# Patient Record
Sex: MALE | Race: WHITE | NOT HISPANIC OR LATINO | Employment: FULL TIME | RURAL
[De-identification: names, ages, dates, MRNs, and addresses within clinical notes are randomized per-mention and may not be internally consistent; named-entity substitution may affect disease eponyms.]

---

## 2022-10-06 ENCOUNTER — TELEMEDICINE (OUTPATIENT)
Dept: FAMILY MEDICINE CLINIC | Facility: CLINIC | Age: 37
End: 2022-10-06

## 2022-10-06 DIAGNOSIS — L03.113 CELLULITIS OF RIGHT HAND: Primary | ICD-10-CM

## 2022-10-06 DIAGNOSIS — S61.451A DOG BITE, HAND, RIGHT, INITIAL ENCOUNTER: ICD-10-CM
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
## 2022-10-06 PROCEDURE — 99213 OFFICE O/P EST LOW 20 MIN: CPT | Performed by: INTERNAL MEDICINE

## 2022-10-06 RX ORDER — AMOXICILLIN AND CLAVULANATE POTASSIUM 875; 125 MG/1; MG/1
1 TABLET, FILM COATED ORAL 2 TIMES DAILY
Qty: 20 TABLET | Refills: 0 | Status: SHIPPED | OUTPATIENT
Start: 2022-10-06 | End: 2023-02-18 | Stop reason: SDUPTHER

## 2022-10-06 NOTE — ASSESSMENT & PLAN NOTE
Dog bite from the family dog 2 days ago with pattern of mild cellulitis, which is doing a bit better today which is reassuring.  Nonetheless we need to initiate Augmentin and mupirocin as per prescriptions.  I reinforced that these types of injuries can become secondarily progressed quite rapidly, and as such he needs to have low threshold to be assessed at an ER if he had new onset pain, swelling, or spreading redness up the arm, in addition to any constitutional Navarrete such as fevers or chills.  He understands.    Note he had his Tdap last 11/9/2015, such he is technically up-to-date, if you are in the clinic I would give him a boost, but he is unable to come in today.  Recommend booster at soonest convenience.

## 2022-10-06 NOTE — PROGRESS NOTES
Telehealth E-Visit      Date: 10/06/2022   Patient Name: Emanuel Capellan  : 1985   MRN: [id number]     Chief Complaint:    Chief Complaint   Patient presents with   • Animal Bite     Bitten by family dog with puncture wound       I have reviewed the E-Visit questionnaire and the patient's answers, my assessment and plan are listed below.     This provider is located at the [facility] (through [facility]), [address] [place] [address] using a secure [employer] Visit through [employer]. Patient is being seen remotely via telehealth at their home address in [place], and stated they are in a secure environment for this session. The patient's condition being diagnosed/treated is appropriate for telemedicine. The provider identified herself as well as her credentials. The patient, and/or patients guardian, consent to be seen remotely, and when consent is given they understand that the consent allows for patient identifiable information to be sent to a third party as needed. They may refuse to be seen remotely at any time. The electronic data is encrypted and password protected, and the patient and/or guardian has been advised of the potential risks to privacy not withstanding such measures.    You have chosen to receive care through a telehealth visit. Do you consent to use a video/audio connection for your medical care today? Yes    History of Present Illness: Emanuel Capellan is a 36 y.o. male who is here today to follow up with concern of dog bite with some redness noted yesterday.  2 days ago, bitten by his family dog, who had an injury.  A single puncture wound on the right hand, overlying the location approximate the middle finger MCP area.  Other than some pain as expected with the bite, it was doing fine, then yesterday had some redness, puffiness and more pain.  He has been soaking it, using Neosporin, and it does seem to be doing a bit better today.  No fevers or  chills, no spreading redness if anything it is doing a little bit better today from around the site of the puncture wound      Subjective      Review of Systems:   Review of Systems    I have reviewed and the following portions of the patient's history were updated as appropriate: past family history, past medical history, past social history, past surgical history and problem list.    Medications:     Current Outpatient Medications:   •  amoxicillin-clavulanate (Augmentin) 875-125 MG per tablet, Take 1 tablet by mouth 2 (Two) Times a Day., Disp: 20 tablet, Rfl: 0  •  mupirocin (BACTROBAN) 2 % ointment, Apply 1 application topically to the appropriate area as directed 3 (Three) Times a Day., Disp: 22 g, Rfl: 0    Allergies:   No Known Allergies    Objective     Physical Exam:  Vital Signs: There were no vitals filed for this visit.  There is no height or weight on file to calculate BMI.    Physical Exam     Limitations per telemedicine visit but he has a small puncture wound at the area of the right MCP, with mild pink coloration and puffiness around the site, but no signs of streaking.  Patient is able to move his hand, flex extend without difficulty but slight sense of tightness.      Assessment / Plan      Assessment/Plan:   Diagnoses and all orders for this visit:    1. Cellulitis of right hand (Primary)  Assessment & Plan:  Dog bite from the family dog 2 days ago with pattern of mild cellulitis, which is doing a bit better today which is reassuring.  Nonetheless we need to initiate Augmentin and mupirocin as per prescriptions.  I reinforced that these types of injuries can become secondarily progressed quite rapidly, and as such he needs to have low threshold to be assessed at an ER if he had new onset pain, swelling, or spreading redness up the arm, in addition to any constitutional Navarrete such as fevers or chills.  He understands.    Note he had his Tdap last 11/9/2015, such he is technically up-to-date, if  you are in the clinic I would give him a boost, but he is unable to come in today.  Recommend booster at soonest convenience.    Orders:  -     amoxicillin-clavulanate (Augmentin) 875-125 MG per tablet; Take 1 tablet by mouth 2 (Two) Times a Day.  Dispense: 20 tablet; Refill: 0  -     mupirocin (BACTROBAN) 2 % ointment; Apply 1 application topically to the appropriate area as directed 3 (Three) Times a Day.  Dispense: 22 g; Refill: 0    2. Dog bite, hand, right, initial encounter    3. Home accident       Follow Up:   No follow-ups on file.    Any medications prescribed have been sent electronically to   [id number] - [place] - [address] AT [facility] [address] - [phone]  - [phone] FX  [address] [place] [address]  Phone: [phone] Fax: [phone]      Charlie Braxton. MD [place]  [place]  10/06/22  11:26 EDT

## 2023-02-18 ENCOUNTER — DOCUMENTATION (OUTPATIENT)
Dept: FAMILY MEDICINE CLINIC | Facility: CLINIC | Age: 38
End: 2023-02-18
Payer: COMMERCIAL

## 2023-02-18 DIAGNOSIS — L03.113 CELLULITIS OF RIGHT HAND: Primary | ICD-10-CM

## 2023-02-18 RX ORDER — AMOXICILLIN AND CLAVULANATE POTASSIUM 875; 125 MG/1; MG/1
1 TABLET, FILM COATED ORAL 2 TIMES DAILY
Qty: 20 TABLET | Refills: 0 | Status: SHIPPED | OUTPATIENT
Start: 2023-02-18

## 2023-02-18 RX ORDER — POLYMYXIN B SULFATE AND TRIMETHOPRIM 1; 10000 MG/ML; [USP'U]/ML
1 SOLUTION OPHTHALMIC EVERY 4 HOURS
Qty: 10 ML | Refills: 0 | Status: SHIPPED | OUTPATIENT
Start: 2023-02-18 | End: 2023-02-25

## 2024-05-28 PROBLEM — J30.1 SEASONAL ALLERGIC RHINITIS DUE TO POLLEN: Status: ACTIVE | Noted: 2024-05-28

## 2024-05-28 PROBLEM — Z00.01 ENCOUNTER FOR GENERAL ADULT MEDICAL EXAMINATION WITH ABNORMAL FINDINGS: Status: ACTIVE | Noted: 2024-05-28

## 2024-05-28 PROBLEM — E78.2 MIXED HYPERLIPIDEMIA: Status: ACTIVE | Noted: 2024-05-28

## 2024-05-29 ENCOUNTER — OFFICE VISIT (OUTPATIENT)
Dept: FAMILY MEDICINE CLINIC | Facility: CLINIC | Age: 39
End: 2024-05-29
Payer: COMMERCIAL

## 2024-05-29 VITALS
SYSTOLIC BLOOD PRESSURE: 120 MMHG | HEART RATE: 70 BPM | BODY MASS INDEX: 25.92 KG/M2 | DIASTOLIC BLOOD PRESSURE: 80 MMHG | OXYGEN SATURATION: 97 % | HEIGHT: 72 IN | TEMPERATURE: 97.7 F | WEIGHT: 191.38 LBS

## 2024-05-29 DIAGNOSIS — Z00.01 ENCOUNTER FOR GENERAL ADULT MEDICAL EXAMINATION WITH ABNORMAL FINDINGS: Primary | ICD-10-CM

## 2024-05-29 DIAGNOSIS — N45.1 EPIDIDYMITIS, LEFT: ICD-10-CM

## 2024-05-29 DIAGNOSIS — E66.3 OVERWEIGHT: ICD-10-CM

## 2024-05-29 DIAGNOSIS — E78.2 MIXED HYPERLIPIDEMIA: ICD-10-CM

## 2024-05-29 DIAGNOSIS — J30.1 SEASONAL ALLERGIC RHINITIS DUE TO POLLEN: ICD-10-CM

## 2024-05-29 DIAGNOSIS — L23.7 POISON IVY: ICD-10-CM

## 2024-05-29 PROBLEM — R30.0 DYSURIA: Status: ACTIVE | Noted: 2024-05-29

## 2024-05-29 PROBLEM — Z72.0 SMOKELESS TOBACCO USE: Status: ACTIVE | Noted: 2024-05-29

## 2024-05-29 PROBLEM — Z87.891 HISTORY OF SMOKELESS TOBACCO USE: Status: ACTIVE | Noted: 2024-05-29

## 2024-05-29 LAB
BILIRUB BLD-MCNC: NEGATIVE MG/DL
CLARITY, POC: CLEAR
COLOR UR: YELLOW
GLUCOSE UR STRIP-MCNC: NEGATIVE MG/DL
KETONES UR QL: NEGATIVE
LEUKOCYTE EST, POC: NEGATIVE
NITRITE UR-MCNC: NEGATIVE MG/ML
PH UR: 6 [PH] (ref 5–8)
PROT UR STRIP-MCNC: NEGATIVE MG/DL
RBC # UR STRIP: NEGATIVE /UL
SP GR UR: 1.02 (ref 1–1.03)
UROBILINOGEN UR QL: ABNORMAL

## 2024-05-29 PROCEDURE — 99395 PREV VISIT EST AGE 18-39: CPT | Performed by: INTERNAL MEDICINE

## 2024-05-29 PROCEDURE — 81002 URINALYSIS NONAUTO W/O SCOPE: CPT | Performed by: INTERNAL MEDICINE

## 2024-05-29 PROCEDURE — 99214 OFFICE O/P EST MOD 30 MIN: CPT | Performed by: INTERNAL MEDICINE

## 2024-05-29 PROCEDURE — 36415 COLL VENOUS BLD VENIPUNCTURE: CPT | Performed by: INTERNAL MEDICINE

## 2024-05-29 RX ORDER — TRIAMCINOLONE ACETONIDE 1 MG/G
1 CREAM TOPICAL 2 TIMES DAILY
Qty: 28.4 G | Refills: 2 | Status: SHIPPED | OUTPATIENT
Start: 2024-05-29

## 2024-05-29 RX ORDER — LEVOFLOXACIN 500 MG/1
500 TABLET, FILM COATED ORAL DAILY
Qty: 7 TABLET | Refills: 0 | Status: SHIPPED | OUTPATIENT
Start: 2024-05-29

## 2024-05-29 NOTE — ASSESSMENT & PLAN NOTE
Modest pattern historically, seasonal more spring and fall, not typically bothersome enough that he needs medicine but sometimes will use over-the-counter antihistamine.  Continue unchanged we could add Flonase and/or Singulair in future for any breakthrough symptoms.  Addition benefit of saline spray, nasal flushing.  Advise concerns.

## 2024-05-29 NOTE — ASSESSMENT & PLAN NOTE
History of periodic dipping of tobacco products up until the age of 33 but she has abstained.  Reinforced long-term benefits of avoidance of smoking products, smokeless products, but patient understand the risks of resumption.

## 2024-05-29 NOTE — PROGRESS NOTES
Venipuncture Blood Specimen Collection  Venipuncture performed in right arm by Michelle Campbell MA with good hemostasis. Patient tolerated the procedure well without complications.   05/29/24   Michelle Campbell MA

## 2024-05-29 NOTE — PROGRESS NOTES
Male Physical Note      Date: 2024   Patient Name: Emanuel Capellan  : 1985   MRN: [id number]     Chief Complaint   Patient presents with    Annual Exam       History of Present Illness: Emanuel Capellan is a 38 y.o. male who is here today for their annual health maintenance and physical.  In addition discussion multimedical problems.  Potential genitourinary complaints over the last few weeks where he had onset of a left lower groin discomfort that would come and go with a bit of a mild ache it was not severe, was not associated to any dysuria, no association of bowel pattern, no postprandial component.  Waxed and waned every few days, just for a couple hours, at which point 4 days ago on Saturday it flared up more notably, with more of the discomfort at that location also some sense of discomfort in the left testicle especially posteriorly palpated.  No dysuria, no sexual dysfunction, no effect on bowel pattern.  No swelling or bulge in the groin region.  Since that time and over the last few days, this pattern has faded notably the next day and to the point which she has not noticed any pain today, other than when I did examine him he has a hint of discomfort on the epididymis.  Still no dysuria, no fevers or chills, feels at baseline.    Unrelated, regarding hyperlipidemia pattern, last checked 5 years ago, overall stable pattern of diet and activity, weight pattern possibly little bit up, he understands he needs to improve that regard.  He has quit dipping as of 5 years ago, understanding risks of ongoing use.  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    Subjective      Review of Systems    Past Medical History, Social History, Family History and Care Team were all reviewed with patient and updated as appropriate.       Current Outpatient Medications:      levoFLOXacin (Levaquin) 500 MG tablet, Take 1 tablet by mouth Daily., Disp: 7 tablet, Rfl: 0    triamcinolone (KENALOG) 0.1 % cream, Apply 1 Application topically to the appropriate area as directed 2 (Two) Times a Day., Disp: 28.4 g, Rfl: 2    No Known Allergies    Immunization History   Administered Date(s) Administered    COVID-19 (PFIZER) Purple Cap Monovalent 08/11/2021, 09/07/2021    Flu Vaccine Intradermal Quad 18-64YR 10/05/2017, 11/07/2019    Hepatitis B Adolescent High Risk Infant 07/31/1998, 08/28/1998, 02/24/1999    Hepatitis B Adult/Adolescent IM 07/31/1998, 08/28/1998, 02/24/1999    Influenza Seasonal Injectable 10/05/2017, 11/07/2019    OPV 07/31/1998    Tdap 11/09/2015        Health Maintenance Summary            Ordered - LIPID PANEL (Yearly) Ordered on 5/29/2024      No completion, postpone, or frequency change history exists for this topic.              Overdue - BMI FOLLOWUP (Yearly) Never done      No completion, postpone, or frequency change history exists for this topic.              Ordered - HEPATITIS C SCREENING (Once) Ordered on 5/29/2024      No completion, postpone, or frequency change history exists for this topic.              Overdue - ANNUAL PHYSICAL (Yearly) Never done      No completion, postpone, or frequency change history exists for this topic.              Overdue - COVID-19 Vaccine (3 - 2023-24 season) Overdue since 9/1/2023 09/07/2021  Imm Admin: COVID-19 (PFIZER) Purple Cap Monovalent    08/11/2021  Imm Admin: COVID-19 (PFIZER) Purple Cap Monovalent              INFLUENZA VACCINE (Yearly - August to March) Next due on 8/1/2024 12/21/2023  Postponed until 3/31/2024 by Mary Sams MA (Patient Refused - declined)    03/23/2023  Postponed until 3/31/2023 by Gayathri Bernal (Patient Refused)    11/07/2019  Imm Admin: Flu Vaccine Intradermal Quad 18-64YR    10/05/2017  Imm Admin: Flu Vaccine Intradermal Quad 18-64YR              TDAP/TD VACCINES (2 - Td or Tdap)  "Next due on 11/9/2025 11/09/2015  Imm Admin: Tdap              Pneumococcal Vaccine 0-64 (Series Information) Aged Out      No completion, postpone, or frequency change history exists for this topic.                    Colorectal Screening:   Not applicable  Last Completed Colonoscopy       This patient has no relevant Health Maintenance data.          CT for Smoker (Age 50-80, 20pk yr within last 15 years): Not applicable  Hep C (Age 18-79 once): Pending with blood work 5/29/2024  HIV (Age 15-65 once) : Pending with blood work 5/29/2024  PSA (Over age 50, C Level Recommendation): No results found for: \"PSA\"  US Aorta (For male smokers, age 65): Not applicable  A1c: No results found for: \"HGBA1C\"  Lipid panel: No results found for: \"LIPIDEXCLUSI\"    The ASCVD Risk score (Eden MEDELLIN, et al., 2019) failed to calculate for the following reasons:    The 2019 ASCVD risk score is only valid for ages 40 to 79      Tobacco Use: High Risk (5/29/2024)    Patient History     Smoking Tobacco Use: Former     Smokeless Tobacco Use: Current     Passive Exposure: Never       Social History     Substance and Sexual Activity   Alcohol Use Yes    Comment: no more than once monthly, beer intake        Social History     Substance and Sexual Activity   Drug Use Not Currently    Comment: History of remote illicit opiate use, none since 2009 with no recurrence.  No other history of any drug use reported        Diet/Physical activity: Overall fairly good activity level but not regular exercise, diet is somewhat balanced but preference for higher calorie foods which he plans to improve    Sexual health:  Social History     Substance and Sexual Activity   Sexual Activity Not on file       PHQ-2 Depression Screening  PHQ-9 Total Score: 0       Intimate partner violence: (Screen on initial visit, older adult with injury or evidence of neglect):  Violence can be a problem in many people's lives, so I now ask every patient about trauma or " "abuse they may have experienced in a relationship.  Stress/Safety - Do you feel safe in your relationship?  Afraid/Abused - Have you ever been in a relationship where you were threatened, hurt, or afraid?  Friend/Family - Are your friends aware you have been hurt?  Emergency Plan - Do you have a safe place to go and the resources you need in an emergency?    Osteoporosis:   Men: history of low trauma fracture, androgen deprivation therapy for prostate cancer, hypogonadism, primary hyperparathyroidism, intestinal disorders.     Objective     Physical Exam:  Vitals:    05/29/24 0847   BP: 120/80   BP Location: Right arm   Patient Position: Sitting   Cuff Size: Adult   Pulse: 70   Temp: 97.7 °F (36.5 °C)   TempSrc: Temporal   SpO2: 97%   Weight: 86.8 kg (191 lb 6 oz)   Height: 182.9 cm (72\")     Body mass index is 25.96 kg/m².     Physical Exam    Procedures    Assessment / Plan      Assessment/Plan:   Diagnoses and all orders for this visit:    1. Encounter for general adult medical examination with abnormal findings (Primary)  Assessment & Plan:  No cardiac or pulmonary problems known. History of opiate abuse, cessation in 2010, with no recurrence since. Episode of hematuria with dysuria, non-concerning evaluation as of 2/12/2016  by Dr. Yo Erickson including non-concerning flexible cystourethroscopy with associated bilateral vasectomy 4/12/2016.   Last screening blood work 2/5/2019, repeat ordered 5/29/2024 with management per results.  Sixth-grade vaccinations given. TDaP vaccination given 11/9/2015.  Recommend yearly flu vaccine and COVID vaccination.    Orders:  -     CBC & Differential; Future  -     Comprehensive Metabolic Panel; Future  -     Lipid Panel; Future  -     TSH Rfx On Abnormal To Free T4; Future  -     Hemoglobin A1c; Future  -     Hepatitis C Antibody; Future  -     HIV-1 / O / 2 Ag / Antibody; Future  -     HIV-1 / O / 2 Ag / Antibody  -     Hepatitis C Antibody  -     Hemoglobin A1c  -     " TSH Rfx On Abnormal To Free T4  -     Lipid Panel  -     Comprehensive Metabolic Panel  -     CBC & Differential    2. Epididymitis, left  Assessment & Plan:  Onset the last couple weeks of a mild discomfort in the left lower groin as well as the epididymal region which over the last few days has essentially resolved.  Only a hint to residual discomfort at the epididymis and in the left groin at the insertion of the spermatic cord.  Overall consistent with epididymitis, no concern for torsion or hernia with negative evaluation in that regard.  Although most likely viral, for completeness we will cover with addition of Levaquin 500 mg daily x 7 days.  Urinalysis notably negative.  At this point time that should continue to resolve if there is recurrence or change in the nature of pain he would need to advise.    Orders:  -     POC Urinalysis Dipstick  -     levoFLOXacin (Levaquin) 500 MG tablet; Take 1 tablet by mouth Daily.  Dispense: 7 tablet; Refill: 0    3. Poison ivy  Assessment & Plan:  Modest pattern of poison ivy especially in the right anterior wrist and upper arm, intermittently flares especially through the summer months as he frequently mows for a part-time job.  Add triamcinolone 0.1% cream 2-3 times daily to affected site, avoid use in the face and genitourinary region.  Additional benefit of antihistamine such as Claritin or Zyrtec for the next 5 to 7 days, then as needed.  Cool compresses, calamine lotion, etc. as benefit symptoms.  Advised if not improving.    Orders:  -     triamcinolone (KENALOG) 0.1 % cream; Apply 1 Application topically to the appropriate area as directed 2 (Two) Times a Day.  Dispense: 28.4 g; Refill: 2    4. Mixed hyperlipidemia  Assessment & Plan:  Modest pattern noted previously.  Most recent cholesterol profile with 2/5/2019 total cholesterol 202, triglycerides 112, HDL 37, . Comparison 2/17/2015 with total cholesterol 210, triglycerides 60, HDL 42, .  Strong  family history including in his mother of notable dyslipidemia.  Continue recommendation healthy diet, exercise and maintaining healthy weight pattern.  Recheck cholesterol profile at this time.      5. Overweight  Assessment & Plan:  Slightly overweight by BMI at 25.9, but in context of dyslipidemia pattern historically, benefits of healthy diet, exercise and even modest weight loss.      6. Seasonal allergic rhinitis due to pollen  Assessment & Plan:  Modest pattern historically, seasonal more spring and fall, not typically bothersome enough that he needs medicine but sometimes will use over-the-counter antihistamine.  Continue unchanged we could add Flonase and/or Singulair in future for any breakthrough symptoms.  Addition benefit of saline spray, nasal flushing.  Advise concerns.           Vaccine Counseling:      Healthcare Maintenance:  Counseling provided based on age appropriate USPSTF guidelines.  BMI is >= 25 and <30. (Overweight) The following options were offered after discussion;: weight loss educational material (shared in after visit summary), exercise counseling/recommendations, and nutrition counseling/recommendations    Emanuel Capellan voices understanding and acceptance of this advice and will call back with any further questions or concerns. AVS with preventive healthcare tips printed for patient.     Follow Up:   Return in about 1 year (around 5/29/2025) for Annual physical.        Charlie Braxton MD  [facility] [place]

## 2024-05-29 NOTE — ASSESSMENT & PLAN NOTE
No cardiac or pulmonary problems known. History of opiate abuse, cessation in 2010, with no recurrence since. Episode of hematuria with dysuria, non-concerning evaluation as of 2/12/2016  by Dr. Yo Erickson including non-concerning flexible cystourethroscopy with associated bilateral vasectomy 4/12/2016.   Last screening blood work 2/5/2019, repeat ordered 5/29/2024 with management per results.  Sixth-grade vaccinations given. TDaP vaccination given 11/9/2015.  Recommend yearly flu vaccine and COVID vaccination.

## 2024-05-29 NOTE — ASSESSMENT & PLAN NOTE
Slightly overweight by BMI at 25.9, but in context of dyslipidemia pattern historically, benefits of healthy diet, exercise and even modest weight loss.

## 2024-05-29 NOTE — ASSESSMENT & PLAN NOTE
Modest pattern noted previously.  Most recent cholesterol profile with 2/5/2019 total cholesterol 202, triglycerides 112, HDL 37, . Comparison 2/17/2015 with total cholesterol 210, triglycerides 60, HDL 42, .  Strong family history including in his mother of notable dyslipidemia.  Continue recommendation healthy diet, exercise and maintaining healthy weight pattern.  Recheck cholesterol profile at this time.

## 2024-05-29 NOTE — ASSESSMENT & PLAN NOTE
Modest pattern of poison ivy especially in the right anterior wrist and upper arm, intermittently flares especially through the summer months as he frequently mows for a part-time job.  Add triamcinolone 0.1% cream 2-3 times daily to affected site, avoid use in the face and genitourinary region.  Additional benefit of antihistamine such as Claritin or Zyrtec for the next 5 to 7 days, then as needed.  Cool compresses, calamine lotion, etc. as benefit symptoms.  Advised if not improving.

## 2024-05-29 NOTE — ASSESSMENT & PLAN NOTE
Onset the last couple weeks of a mild discomfort in the left lower groin as well as the epididymal region which over the last few days has essentially resolved.  Only a hint to residual discomfort at the epididymis and in the left groin at the insertion of the spermatic cord.  Overall consistent with epididymitis, no concern for torsion or hernia with negative evaluation in that regard.  Although most likely viral, for completeness we will cover with addition of Levaquin 500 mg daily x 7 days.  Urinalysis notably negative.  At this point time that should continue to resolve if there is recurrence or change in the nature of pain he would need to advise.

## 2024-05-29 NOTE — PATIENT INSTRUCTIONS

## 2024-05-30 ENCOUNTER — TELEPHONE (OUTPATIENT)
Dept: FAMILY MEDICINE CLINIC | Facility: CLINIC | Age: 39
End: 2024-05-30
Payer: COMMERCIAL

## 2024-05-30 DIAGNOSIS — E78.2 MIXED HYPERLIPIDEMIA: Primary | ICD-10-CM

## 2024-05-30 DIAGNOSIS — R79.89 ABNORMAL LFTS: ICD-10-CM

## 2024-05-30 LAB
ALBUMIN SERPL-MCNC: 4.7 G/DL (ref 4.1–5.1)
ALBUMIN/GLOB SERPL: 1.7 {RATIO} (ref 1.2–2.2)
ALP SERPL-CCNC: 84 IU/L (ref 44–121)
ALT SERPL-CCNC: 51 IU/L (ref 0–44)
AST SERPL-CCNC: 49 IU/L (ref 0–40)
BASOPHILS # BLD AUTO: 0.1 X10E3/UL (ref 0–0.2)
BASOPHILS NFR BLD AUTO: 1 %
BILIRUB SERPL-MCNC: 0.6 MG/DL (ref 0–1.2)
BUN SERPL-MCNC: 11 MG/DL (ref 6–20)
BUN/CREAT SERPL: 12 (ref 9–20)
CALCIUM SERPL-MCNC: 9.8 MG/DL (ref 8.7–10.2)
CHLORIDE SERPL-SCNC: 101 MMOL/L (ref 96–106)
CHOLEST SERPL-MCNC: 288 MG/DL (ref 100–199)
CO2 SERPL-SCNC: 24 MMOL/L (ref 20–29)
CREAT SERPL-MCNC: 0.94 MG/DL (ref 0.76–1.27)
EGFRCR SERPLBLD CKD-EPI 2021: 107 ML/MIN/1.73
EOSINOPHIL # BLD AUTO: 0.1 X10E3/UL (ref 0–0.4)
EOSINOPHIL NFR BLD AUTO: 3 %
ERYTHROCYTE [DISTWIDTH] IN BLOOD BY AUTOMATED COUNT: 13.1 % (ref 11.6–15.4)
GLOBULIN SER CALC-MCNC: 2.7 G/DL (ref 1.5–4.5)
GLUCOSE SERPL-MCNC: 89 MG/DL (ref 70–99)
HBA1C MFR BLD: 5.6 % (ref 4.8–5.6)
HCT VFR BLD AUTO: 47.8 % (ref 37.5–51)
HCV IGG SERPL QL IA: NON REACTIVE
HDLC SERPL-MCNC: 55 MG/DL
HGB BLD-MCNC: 16.3 G/DL (ref 13–17.7)
HIV 1+2 AB+HIV1 P24 AG SERPL QL IA: NON REACTIVE
IMM GRANULOCYTES # BLD AUTO: 0 X10E3/UL (ref 0–0.1)
IMM GRANULOCYTES NFR BLD AUTO: 0 %
LABORATORY COMMENT REPORT: ABNORMAL
LDLC SERPL CALC-MCNC: 206 MG/DL (ref 0–99)
LYMPHOCYTES # BLD AUTO: 1.9 X10E3/UL (ref 0.7–3.1)
LYMPHOCYTES NFR BLD AUTO: 34 %
MCH RBC QN AUTO: 30.5 PG (ref 26.6–33)
MCHC RBC AUTO-ENTMCNC: 34.1 G/DL (ref 31.5–35.7)
MCV RBC AUTO: 89 FL (ref 79–97)
MONOCYTES # BLD AUTO: 0.5 X10E3/UL (ref 0.1–0.9)
MONOCYTES NFR BLD AUTO: 8 %
NEUTROPHILS # BLD AUTO: 3 X10E3/UL (ref 1.4–7)
NEUTROPHILS NFR BLD AUTO: 54 %
PLATELET # BLD AUTO: 259 X10E3/UL (ref 150–450)
POTASSIUM SERPL-SCNC: ABNORMAL MMOL/L
PROT SERPL-MCNC: 7.4 G/DL (ref 6–8.5)
RBC # BLD AUTO: 5.35 X10E6/UL (ref 4.14–5.8)
SODIUM SERPL-SCNC: 141 MMOL/L (ref 134–144)
TRIGL SERPL-MCNC: 147 MG/DL (ref 0–149)
TSH SERPL DL<=0.005 MIU/L-ACNC: 1.5 UIU/ML (ref 0.45–4.5)
VLDLC SERPL CALC-MCNC: 27 MG/DL (ref 5–40)
WBC # BLD AUTO: 5.6 X10E3/UL (ref 3.4–10.8)

## 2024-05-30 RX ORDER — ROSUVASTATIN CALCIUM 20 MG/1
20 TABLET, COATED ORAL DAILY
Qty: 90 TABLET | Refills: 1 | Status: SHIPPED | OUTPATIENT
Start: 2024-05-30

## 2024-05-30 RX ORDER — ATORVASTATIN CALCIUM 40 MG/1
40 TABLET, FILM COATED ORAL DAILY
Qty: 90 TABLET | Refills: 1 | Status: CANCELLED | OUTPATIENT
Start: 2024-05-30

## 2024-05-30 NOTE — TELEPHONE ENCOUNTER
Review of laboratory investigations from 5/29/2024.  Notable results as follows:    HIV and hepatitis C virus screening negative.  Hemoglobin A1c at the upper limit of normal is diabetes screening at 5.6% with 5.7-6.4% representing prediabetes.  TSH normal at 1.500.  Total cholesterol 288, triglycerides 147, HDL 55, .  Comparison 2/5/2019 with total cholesterol 202, triglycerides 112, HDL 37, .  Notably worsening profile, discussion as below.  CMP with notable normal glucose 89, good kidney function with creatinine 0.94, .  Nonconcerning electrolytes, proteins.  Liver function test notable for increased AST of 49 with upper limit of normal 40, ALT at 51 with upper limit of normal 44.  Discussion below.  CBC with differential with normal blood counts.    Related to cholesterol, significant elevation greater than 190 which typically recommend initiation of high intensity statin, would recommend Crestor 20 mg daily, in addition to healthy diet, exercise and weight loss.  Related to increased AST and ALT which is slight elevation, for thoroughness I would recommend additional blood work including a full hepatitis panel, hematoma corrosive screening with iron/TIBC ratio, and a right upper quadrant/liver ultrasound, although there is a good probability and associated his cholesterol and dietary pattern that this could be related to hepatic steatosis/fatty liver disease.  Plan to recheck CMP additionally when I recheck CMP and lipid panel in 3 months time after initiation of statin medication.

## 2024-07-03 ENCOUNTER — TELEPHONE (OUTPATIENT)
Dept: FAMILY MEDICINE CLINIC | Facility: CLINIC | Age: 39
End: 2024-07-03
Payer: COMMERCIAL

## 2024-07-03 DIAGNOSIS — R79.89 ABNORMAL LFTS: ICD-10-CM

## 2024-07-03 NOTE — TELEPHONE ENCOUNTER
----- Message from Charlie Braxton sent at 7/3/2024 11:32 AM EDT -----  Please advise patient of results of ultrasound right upper quadrant as obtained 6/26/2024 at [facility] in [place].  Liver, and liver ducts are all within normal appearance.  Ultimately with very modest liver test elevation, I still suspect that that was related to mild pattern of fatty liver disease but with this reassuring ultrasound result, no further evaluation necessary.  Continue healthy diet, exercise and pursuit of even modest weight loss.

## 2024-10-02 ENCOUNTER — CLINICAL SUPPORT (OUTPATIENT)
Dept: FAMILY MEDICINE CLINIC | Facility: CLINIC | Age: 39
End: 2024-10-02
Payer: COMMERCIAL

## 2024-10-02 DIAGNOSIS — E78.2 MIXED HYPERLIPIDEMIA: Primary | ICD-10-CM

## 2024-10-02 DIAGNOSIS — R79.89 ABNORMAL LFTS: ICD-10-CM

## 2024-10-02 PROCEDURE — 36415 COLL VENOUS BLD VENIPUNCTURE: CPT | Performed by: INTERNAL MEDICINE

## 2024-10-02 NOTE — PROGRESS NOTES
Venipuncture Blood Specimen Collection  Venipuncture performed in left arm by Michelle Campbell MA with good hemostasis. Patient tolerated the procedure well without complications.   10/02/24   Michelle Campbell MA

## 2024-10-03 LAB
ALBUMIN SERPL-MCNC: 5 G/DL (ref 4.1–5.1)
ALP SERPL-CCNC: 74 IU/L (ref 44–121)
ALT SERPL-CCNC: 49 IU/L (ref 0–44)
AST SERPL-CCNC: 32 IU/L (ref 0–40)
BILIRUB SERPL-MCNC: 0.7 MG/DL (ref 0–1.2)
BUN SERPL-MCNC: 11 MG/DL (ref 6–20)
BUN/CREAT SERPL: 13 (ref 9–20)
CALCIUM SERPL-MCNC: 9.9 MG/DL (ref 8.7–10.2)
CHLORIDE SERPL-SCNC: 100 MMOL/L (ref 96–106)
CHOLEST SERPL-MCNC: 178 MG/DL (ref 100–199)
CO2 SERPL-SCNC: 24 MMOL/L (ref 20–29)
CREAT SERPL-MCNC: 0.87 MG/DL (ref 0.76–1.27)
EGFRCR SERPLBLD CKD-EPI 2021: 114 ML/MIN/1.73
GLOBULIN SER CALC-MCNC: 2.3 G/DL (ref 1.5–4.5)
GLUCOSE SERPL-MCNC: 97 MG/DL (ref 70–99)
HDLC SERPL-MCNC: 56 MG/DL
LDLC SERPL CALC-MCNC: 105 MG/DL (ref 0–99)
POTASSIUM SERPL-SCNC: 4.6 MMOL/L (ref 3.5–5.2)
PROT SERPL-MCNC: 7.3 G/DL (ref 6–8.5)
SODIUM SERPL-SCNC: 140 MMOL/L (ref 134–144)
TRIGL SERPL-MCNC: 92 MG/DL (ref 0–149)
VLDLC SERPL CALC-MCNC: 17 MG/DL (ref 5–40)

## 2024-10-07 ENCOUNTER — TELEPHONE (OUTPATIENT)
Dept: FAMILY MEDICINE CLINIC | Facility: CLINIC | Age: 39
End: 2024-10-07
Payer: COMMERCIAL

## 2024-10-07 NOTE — TELEPHONE ENCOUNTER
----- Message from Charlie Braxton sent at 10/7/2024  8:07 AM EDT -----  Please advise patient of results of laboratory investigations as obtained 10/2/2024.  Notable results as follows:    Total cholesterol 178, triglycerides 92, HDL 56, .  Significant improved compared to 4 months ago with total cholesterol 288, triglycerides 147, HDL 55 and .  CMP with notable glucose 97, good kidney function creatinine 0.87, .  Normal electrolytes, proteins and almost full resolution of liver function test with notable AST improved from 49 down to 32 and ALT from 51-49 with upper limit of normal 44.  Overall reassuring pattern.    Excellent respon initiation of Crestor 20 mg daily in addition to healthy diet and activity, with most notably improvement of LDL from 206 down to 105.  Continue regimen unchanged.  Recheck cholesterol with blood work next year.

## 2024-10-07 NOTE — TELEPHONE ENCOUNTER
I have left a detailed message for patient as he is on vacation. He will call back later this week or next.

## 2024-10-07 NOTE — TELEPHONE ENCOUNTER
----- Message from Charlie Braxton sent at 10/7/2024  8:07 AM EDT -----  Please advise patient of results of laboratory investigations as obtained 10/2/2024.  Notable results as follows:    Total cholesterol 178, triglycerides 92, HDL 56, .  Significant improved compared to 4 months ago with total cholesterol 288, triglycerides 147, HDL 55 and .  CMP with notable glucose 97, good kidney function creatinine 0.87, .  Normal electrolytes, proteins and almost full resolution of liver function test with notable AST improved from 49 down to 32 and ALT from 51-49 with upper limit of normal 44.  Overall reassuring pattern.    Excellent respon initiation of Crestor 20 mg daily in addition to healthy diet and activity, with most notably improvement of LDL from 206 down to 105.  Continue regimen unchanged.  Recheck cholesterol with blood work next year.    stated

## 2024-12-06 DIAGNOSIS — E78.2 MIXED HYPERLIPIDEMIA: ICD-10-CM

## 2024-12-06 RX ORDER — ROSUVASTATIN CALCIUM 20 MG/1
20 TABLET, COATED ORAL DAILY
Qty: 90 TABLET | Refills: 1 | Status: SHIPPED | OUTPATIENT
Start: 2024-12-06

## 2025-06-30 DIAGNOSIS — E78.2 MIXED HYPERLIPIDEMIA: ICD-10-CM

## 2025-07-01 RX ORDER — ROSUVASTATIN CALCIUM 20 MG/1
20 TABLET, COATED ORAL DAILY
Qty: 90 TABLET | Refills: 0 | Status: SHIPPED | OUTPATIENT
Start: 2025-07-01